# Patient Record
(demographics unavailable — no encounter records)

---

## 2024-12-11 NOTE — HISTORY OF PRESENT ILLNESS
[5] : 5 [3] : 3 [Sharp] : sharp [Injection therapy] : injection therapy [de-identified] : Pt. is a 57 year old female with b/l feet and b/l ankles. Pt states that pain started [id number]. History of flat feet. Previous bunionectomy (2000), hammer toe repair (2001). She has tried orthotics in the past. Podiatrist gave steroid injection to both plantar heels early April 2024. She has done a few sessions of PT. She has not been wearing air lift braces.   States she has been having pain in the feet. She has been using her orthotics which has helped. She went to [facility] and got her orthotics re-done.   [] : no [FreeTextEntry1] : b/l feet/ankles

## 2024-12-11 NOTE — PHYSICAL EXAM
[NL (40)] : plantar flexion 40 degrees [NL (20)] : eversion 20 degrees [Bilateral] : foot and ankle bilaterally [4___] : inversion 4[unfilled]/5 [5___] : eversion 5[unfilled]/5 [2+] : posterior tibialis pulse: 2+ [Normal] : saphenous nerve sensation normal [] : non-antalgic [FreeTextEntry3] : Bilateral hallux varus left worse than right. [FreeTextEntry9] : RT ankle: 20 40 15 20 [de-identified] : Strength is symmetrical.  [de-identified] : inversion 20 degrees

## 2024-12-11 NOTE — DISCUSSION/SUMMARY
[de-identified] : WBAT in supportive footwear, custom orthotics rx provided. PT is recommended, new rx provided. Activity modification.

## 2024-12-11 NOTE — HISTORY OF PRESENT ILLNESS
[5] : 5 [3] : 3 [Sharp] : sharp [Injection therapy] : injection therapy [de-identified] : Pt. is a 57 year old female with b/l feet and b/l ankles. Pt states that pain started [id number]. History of flat feet. Previous bunionectomy (2000), hammer toe repair (2001). She has tried orthotics in the past. Podiatrist gave steroid injection to both plantar heels early April 2024. She has done a few sessions of PT. She has not been wearing air lift braces.   States she has been having pain in the feet. She has been using her orthotics which has helped. She went to [facility] and got her orthotics re-done.   [] : no [FreeTextEntry1] : b/l feet/ankles

## 2024-12-11 NOTE — DISCUSSION/SUMMARY
[de-identified] : WBAT in supportive footwear, custom orthotics rx provided. PT is recommended, new rx provided. Activity modification.

## 2024-12-11 NOTE — DISCUSSION/SUMMARY
[de-identified] : WBAT in supportive footwear, custom orthotics rx provided. PT is recommended, new rx provided. Activity modification.

## 2024-12-11 NOTE — PHYSICAL EXAM
[NL (40)] : plantar flexion 40 degrees [NL (20)] : eversion 20 degrees [Bilateral] : foot and ankle bilaterally [4___] : inversion 4[unfilled]/5 [5___] : eversion 5[unfilled]/5 [2+] : posterior tibialis pulse: 2+ [Normal] : saphenous nerve sensation normal [] : non-antalgic [FreeTextEntry3] : Bilateral hallux varus left worse than right. [FreeTextEntry9] : RT ankle: 20 40 15 20 [de-identified] : Strength is symmetrical.  [de-identified] : inversion 20 degrees

## 2024-12-11 NOTE — HISTORY OF PRESENT ILLNESS
[5] : 5 [3] : 3 [Sharp] : sharp [Injection therapy] : injection therapy [de-identified] : Pt. is a 57 year old female with b/l feet and b/l ankles. Pt states that pain started [id number]. History of flat feet. Previous bunionectomy (2000), hammer toe repair (2001). She has tried orthotics in the past. Podiatrist gave steroid injection to both plantar heels early April 2024. She has done a few sessions of PT. She has not been wearing air lift braces.   States she has been having pain in the feet. She has been using her orthotics which has helped. She went to [facility] and got her orthotics re-done.   [] : no [FreeTextEntry1] : b/l feet/ankles

## 2024-12-11 NOTE — PHYSICAL EXAM
[NL (40)] : plantar flexion 40 degrees [NL (20)] : eversion 20 degrees [Bilateral] : foot and ankle bilaterally [4___] : inversion 4[unfilled]/5 [5___] : eversion 5[unfilled]/5 [2+] : posterior tibialis pulse: 2+ [Normal] : saphenous nerve sensation normal [] : non-antalgic [FreeTextEntry3] : Bilateral hallux varus left worse than right. [FreeTextEntry9] : RT ankle: 20 40 15 20 [de-identified] : Strength is symmetrical.  [de-identified] : inversion 20 degrees

## 2025-01-08 NOTE — IMAGING
[de-identified] : RIGHT KNEE Inspection:  mild effusion Palpation: medial joint line tenderness, anterior tenderness Knee Range of Motion:  3-125  Strength: 5/5 Quadriceps strength, 5/5 Hamstring strength Neurological: light touch is intact throughout Ligament Stability and Special Tests:  McMurrays: neg Lachman: neg Pivot Shift: neg Posterior Drawer: neg Valgus: neg Varus: neg Patella Apprehension: neg Patella Maltracking: neg    LEFT KNEE Inspection:  mild effusion Palpation: medial joint line tenderness, anterior tenderness Knee Range of Motion:  3-125  Strength: 5/5 Quadriceps strength, 5/5 Hamstring strength Neurological: light touch is intact throughout Ligament Stability and Special Tests:  McMurrays: neg Lachman: neg Pivot Shift: neg Posterior Drawer: neg Valgus: neg Varus: neg Patella Apprehension: neg Patella Maltracking: neg

## 2025-01-08 NOTE — ASSESSMENT
[FreeTextEntry1] : Bilateral X-Ray Examination of the KNEE (4 views): medial and mod patellofemoral degenerative changes.   - We discussed their diagnosis and treatment options at length including the risks and benefits of both surgical and non-surgical options. Surgical risks include but are not limited to pain, infection, bleeding, vascular injury, numbness, tingling, nerve damage. - Due to risks of surgery, we will continue conservative treatment with PT, icing, and anti-inflammatory medications - The patient was provided with a PT prescription to work on ROM, hip ER/abductors strengthening, quad/hamstring stretches and strengthening, and other exercises - The patient was advised to let pain guide the gradual advancement of activities. - discussed Rx for diclofenac  but due to risk bleeding with eliquis we will hold off - We also discussed the possible of a corticosteroid injection in order to help decrease inflammation and pain so that they can perform better therapy and they wished to proceed with this treatment course. - Follow up as needed in 6 weeks to re-evaluate, if no improvement we spoke about possibility of viscosupplementation injections    Medication Discussion: 1) We discussed a comprehensive treatment plan that included possible pharmaceutical management involving the use of prescription strength medications including but not limited to options such as oral Naprosyn 500mg BID, once daily Meloxicam 15 mg, or 500-650 mg Tylenol versus over the counter oral medications in addition to discussing possible topical prescription Pennsaid vs  Voltaren gel. 2) There is a moderate risk of morbidity with further treatment, especially from use of prescription strength medications and possible side effects of these medications which include but are not limited to upset stomach with oral medications, skin reactions to topical medications and GI/cardiac/renal issues with long term use. 3) I recommended that the patient follow-up with their medical physician if there are any significant potential issues with long term medication use such as interactions with current medications or with exacerbation of underlying medical comorbidities. 4) The benefits and risks associated with use of oral and / or topical prescription and over the counter anti-inflammatory medications were discussed with the patient. The patient voiced understanding of the risks including but not limited to bleeding, stroke, kidney dysfunction, heart disease, and were referred to the black box warning label for further information.

## 2025-01-08 NOTE — HISTORY OF PRESENT ILLNESS
[de-identified] : 58 year old female  (  )   chronic B/L knee pain acutley worsening since 9/1/23 L>R with no DONNELL, went to ER for Aspiration Knee, that came back neg for Gout or infecton. cont pain and swelling The pain is located  anterior and posterior The pain is associated with swelling, stiffness Worse with weight bearing activity and better at rest. Has tried icing, Tylenol **can't take NSAIDS, on on eliquis for DVT and PEs*** PMHX rheum arthritis  1/3/24- B/L knee CSI (9/14/23) with relief, pain returns since end of dec 2023 5/8/24- B/L knee CSI (1/3/24)  with only temp releif, icing, mod activtiy 1/8/25- B/L knees CSI (5/8/24) with temp relief. Mod activity. doing HEP worsening pain recently